# Patient Record
Sex: FEMALE | Race: WHITE | NOT HISPANIC OR LATINO
[De-identification: names, ages, dates, MRNs, and addresses within clinical notes are randomized per-mention and may not be internally consistent; named-entity substitution may affect disease eponyms.]

---

## 2023-09-05 PROBLEM — Z00.00 ENCOUNTER FOR PREVENTIVE HEALTH EXAMINATION: Status: ACTIVE | Noted: 2023-09-05

## 2023-09-06 ENCOUNTER — APPOINTMENT (OUTPATIENT)
Dept: OTOLARYNGOLOGY | Facility: CLINIC | Age: 73
End: 2023-09-06
Payer: MEDICARE

## 2023-09-06 DIAGNOSIS — Z87.891 PERSONAL HISTORY OF NICOTINE DEPENDENCE: ICD-10-CM

## 2023-09-06 DIAGNOSIS — Z82.49 FAMILY HISTORY OF ISCHEMIC HEART DISEASE AND OTHER DISEASES OF THE CIRCULATORY SYSTEM: ICD-10-CM

## 2023-09-06 DIAGNOSIS — J32.9 CHRONIC SINUSITIS, UNSPECIFIED: ICD-10-CM

## 2023-09-06 DIAGNOSIS — Z86.69 PERSONAL HISTORY OF OTHER DISEASES OF THE NERVOUS SYSTEM AND SENSE ORGANS: ICD-10-CM

## 2023-09-06 DIAGNOSIS — Z82.5 FAMILY HISTORY OF ASTHMA AND OTHER CHRONIC LOWER RESPIRATORY DISEASES: ICD-10-CM

## 2023-09-06 DIAGNOSIS — J30.9 ALLERGIC RHINITIS, UNSPECIFIED: ICD-10-CM

## 2023-09-06 PROCEDURE — 31231 NASAL ENDOSCOPY DX: CPT

## 2023-09-06 PROCEDURE — 99204 OFFICE O/P NEW MOD 45 MIN: CPT | Mod: 25

## 2023-09-06 RX ORDER — AMOXICILLIN AND CLAVULANATE POTASSIUM 875; 125 MG/1; MG/1
875-125 TABLET, COATED ORAL
Refills: 0 | Status: ACTIVE | COMMUNITY

## 2023-09-06 RX ORDER — FAMOTIDINE 10 MG/1
TABLET, FILM COATED ORAL
Refills: 0 | Status: ACTIVE | COMMUNITY

## 2023-09-06 NOTE — DATA REVIEWED
Normal vision: sees adequately in most situations; can see medication labels, newsprint
[de-identified] : PATIENT NAME: Alka Chowdhury PATIENT PHONE NUMBER: [phone] PATIENT ID: [id number] : 1950 DATE OF EXAM: 2023 R. Phys. Name: All Guerra RJane Phys. Address: [address] [place] [address] R Phys. Phone: [phone] CT-MAXIFACIAL SINUS NON CONTRAST  History: H92.01 Otalgia, right ear R51 Headache/Facial Pain J01.00 Acute maxillary sinusitis, unspecified J01.10 Acute frontal sinusitis, unspecified F17.210 Current smoker  No prior  Maxillofacial CT was performed with multislice axial images and reconstructions performed in axial, sagittal and coronal planes. This study was performed using automatic exposure control and an iterative reconstruction technique (radiation dose reduction software) to obtain a diagnostic image quality scan with patient dose as low as reasonably achievable. The mA and kV were adjusted according to patient size. The administered radiation dose was 1.06 mSv.  FINDINGS:  FRONTAL SINUSES, DRAINAGE PATHWAYS, AND ASSOCIATED ANATOMIC VARIANTS: Minimal left frontal sinus disease. The right frontal sinus is underpneumatized.  ETHMOID SINUSES: The ethmoid sinuses are clear  SPHENOID SINUSES, DRAINAGE PATHWAYS, AND ASSOCIATED VARIANTS: There is mild mucosal thickening in the left sphenoid sinus with occluded sphenoethmoidal recess.  MAXILLARY SINUSES, DRAINAGE PATHWAYS, AND ASSOCIATED VARIANTS: Mild lobulated mucosal thickening in the left maxillary sinus with occluded estimate unit.  NASAL SEPTUM/NASAL CAVITY: The nasal septum is deviated to the right  MASTOID AIR CELLS: The mastoid air cells are well developed and aerated.  OTHER: Limited evaluation of the brain parenchyma is unremarkable.  IMPRESSION:  1. Mild left sphenoid sinus disease with occluded sphenoethmoidal recess. 2. Mild left maxillary sinus disease with occluded ostiomeatal unit. 3. Minimal left frontal sinus disease.  ICD 10 -   Signed by: Shayna Peterson MD Signed Date: 2023 4:27 PM EDT    SIGNED BY: Shayna Petersno M.D., Ext. [phone] 04:27 PM

## 2023-09-06 NOTE — ASSESSMENT
[FreeTextEntry1] : Pt has no evidence of chronic sinusitis at this time.  Her CT scans from July show minimal sinus findings, despite that pt states she was feeling very symptomatic on the day of the scan.   Pt's findings are much more consistent with uncontrolled allergies.  She was encouraged to f/u with her allergist for additional management.

## 2023-09-06 NOTE — CONSULT LETTER
[Dear  ___] : Dear  [unfilled], [Please see my note below.] : Please see my note below. [Consult Closing:] : Thank you very much for allowing me to participate in the care of this patient.  If you have any questions, please do not hesitate to contact me. [Sincerely,] : Sincerely, [FreeTextEntry2] : All Guerra MD [FreeTextEntry3] : Maged Reynolds MD, FACS  Chief of Otolaryngology at [facility]    Dept. of Otolaryngology  [facility]  Alice Galan [facility], PA-C Department of Otolaryngology [facility] Otolaryngology at [place]  [DrJane  ___] : Dr. LING

## 2023-09-06 NOTE — HISTORY OF PRESENT ILLNESS
[de-identified] : 71yo F presenting with recurrent sinusitis x 1 year. She has been on antibiotics consistently, most recently was on Augmentin for 3 months and still taking it. She has hx of chronic fatigue syndrome and is immunodeficient however this is more than usual. She reports blurry vision, nasal congestion, PND and sinus pressure during active infection. She tried flonase but intermittently uses.   Pt has a known h/o allergies but is unable to take any antihistamines.

## 2023-09-06 NOTE — PROCEDURE
[Anterior rhinoscopy insufficient to account for symptoms] : anterior rhinoscopy insufficient to account for symptoms [None] : none [Flexible Endoscope] : examined with the flexible endoscope [Serial Number: ___] : Serial Number: [unfilled] [Paranasal Sinuses Middle Meatus] : no purulence [Normal] : the paranasal sinuses had no abnormalities [Congested] : congested [Pale] : pale

## 2025-02-06 ENCOUNTER — APPOINTMENT (OUTPATIENT)
Dept: UROGYNECOLOGY | Facility: CLINIC | Age: 75
End: 2025-02-06

## 2025-04-11 ENCOUNTER — NON-APPOINTMENT (OUTPATIENT)
Age: 75
End: 2025-04-11

## 2025-04-11 ENCOUNTER — APPOINTMENT (OUTPATIENT)
Dept: UROGYNECOLOGY | Facility: CLINIC | Age: 75
End: 2025-04-11
Payer: MEDICARE

## 2025-04-11 VITALS
DIASTOLIC BLOOD PRESSURE: 82 MMHG | SYSTOLIC BLOOD PRESSURE: 155 MMHG | BODY MASS INDEX: 24.84 KG/M2 | RESPIRATION RATE: 14 BRPM | HEART RATE: 89 BPM | WEIGHT: 135 LBS | HEIGHT: 62 IN

## 2025-04-11 DIAGNOSIS — N81.4 UTEROVAGINAL PROLAPSE, UNSPECIFIED: ICD-10-CM

## 2025-04-11 LAB
BILIRUB UR QL STRIP: NEGATIVE
CLARITY UR: CLEAR
COLLECTION METHOD: NORMAL
GLUCOSE UR-MCNC: NEGATIVE
HCG UR QL: 0.2 EU/DL
HGB UR QL STRIP.AUTO: NEGATIVE
KETONES UR-MCNC: NEGATIVE
LEUKOCYTE ESTERASE UR QL STRIP: NEGATIVE
NITRITE UR QL STRIP: NEGATIVE
PH UR STRIP: 7
PROT UR STRIP-MCNC: NEGATIVE
SP GR UR STRIP: 1.01

## 2025-04-11 PROCEDURE — 51701 INSERT BLADDER CATHETER: CPT | Mod: 59

## 2025-04-11 PROCEDURE — 99459 PELVIC EXAMINATION: CPT

## 2025-04-11 PROCEDURE — 99204 OFFICE O/P NEW MOD 45 MIN: CPT | Mod: 25

## 2025-04-11 PROCEDURE — 81003 URINALYSIS AUTO W/O SCOPE: CPT | Mod: QW
